# Patient Record
Sex: FEMALE
[De-identification: names, ages, dates, MRNs, and addresses within clinical notes are randomized per-mention and may not be internally consistent; named-entity substitution may affect disease eponyms.]

---

## 2017-01-25 ENCOUNTER — HOSPITAL ENCOUNTER (OUTPATIENT)
Dept: HOSPITAL 62 - RAD | Age: 67
End: 2017-01-25
Attending: INTERNAL MEDICINE
Payer: MEDICARE

## 2017-01-25 DIAGNOSIS — I12.9: Primary | ICD-10-CM

## 2017-01-25 DIAGNOSIS — E87.6: ICD-10-CM

## 2017-01-25 DIAGNOSIS — N18.3: ICD-10-CM

## 2017-01-25 PROCEDURE — 93976 VASCULAR STUDY: CPT

## 2020-01-28 ENCOUNTER — HOSPITAL ENCOUNTER (OUTPATIENT)
Dept: HOSPITAL 62 - RAD | Age: 70
End: 2020-01-28
Attending: INTERNAL MEDICINE
Payer: MEDICARE

## 2020-01-28 DIAGNOSIS — I70.1: Primary | ICD-10-CM

## 2020-01-28 PROCEDURE — 93975 VASCULAR STUDY: CPT

## 2020-01-28 NOTE — RADIOLOGY REPORT (SQ)
EXAM DESCRIPTION:  DUPLEX ART/SANKET FLOW COMPLETE



COMPLETED DATE/TIME:  1/28/2020 8:13 am



REASON FOR STUDY:  ADITI (I70.1) I70.1  ATHEROSCLEROSIS OF RENAL ARTERY



COMPARISON:  None.



TECHNIQUE:  Realtime and static grayscale images acquired. Selected color Doppler, velocities and spe
ctral images recorded.



LIMITATIONS:  None.



FINDINGS:  RIGHT KIDNEY:

RENAL ARTERY VELOCITIES: 64 cm/sec.  Segmental artery velocity 21 cm/sec.

RENAL VEIN:  Color doppler flow present, patent.

VELOCITY RATIO: 1.6.  Normal waveforms.

KIDNEY:  Normal size.   Mild dilatation of the renal pelvis.

LEFT KIDNEY:

RENAL ARTERY VELOCITIES: 61 cm/sec.  Segmental artery velocity 13 cm/sec.

RENAL VEIN:  Color doppler flow present, patent.

VELOCITY RATIO: 1.0. Normal waveforms.

KIDNEY:  Normal size.   Possible solid mass 1.8 cm lower pole.

BLADDER: Normal.

OTHER: No other significant finding.



IMPRESSION:

1. No evidence of renal artery stenosis.

2. Abnormalities in both kidneys for which follow-up dedicated renal CT or MRI is recommended.



COMMENT:  NORMAL RENAL ARTERY/AORTA VELOCITY RATIO IS LESS THAN OR EQUAL TO 3.5.



TECHNICAL DOCUMENTATION:  JOB ID:  [id number]

 2011 [employer]- All Rights Reserved



Reading location - IP/workstation name: [email or web address]